# Patient Record
Sex: MALE | Race: WHITE | NOT HISPANIC OR LATINO | Employment: UNEMPLOYED | URBAN - METROPOLITAN AREA
[De-identification: names, ages, dates, MRNs, and addresses within clinical notes are randomized per-mention and may not be internally consistent; named-entity substitution may affect disease eponyms.]

---

## 2024-03-05 ENCOUNTER — HOSPITAL ENCOUNTER (OUTPATIENT)
Dept: RADIOLOGY | Facility: MEDICAL CENTER | Age: 49
End: 2024-03-05
Attending: INTERNAL MEDICINE
Payer: COMMERCIAL

## 2024-03-05 DIAGNOSIS — D47.3 ESSENTIAL THROMBOCYTHEMIA (HCC): ICD-10-CM

## 2024-03-05 DIAGNOSIS — D75.839 THROMBOCYTHEMIA: ICD-10-CM

## 2024-03-05 PROCEDURE — 76700 US EXAM ABDOM COMPLETE: CPT

## 2024-09-20 ENCOUNTER — HOSPITAL ENCOUNTER (OUTPATIENT)
Dept: RADIOLOGY | Facility: MEDICAL CENTER | Age: 49
End: 2024-09-20
Attending: STUDENT IN AN ORGANIZED HEALTH CARE EDUCATION/TRAINING PROGRAM
Payer: COMMERCIAL

## 2024-09-20 DIAGNOSIS — R91.8 LUNG FIELD ABNORMAL: ICD-10-CM

## 2024-09-20 LAB — GLUCOSE BLD-MCNC: 89 MG/DL (ref 65–99)

## 2024-09-20 PROCEDURE — A9552 F18 FDG: HCPCS

## 2024-10-07 ASSESSMENT — ENCOUNTER SYMPTOMS
CHEST TIGHTNESS: 0
RESPIRATORY SYMPTOMS COMMENTS: NO
HEMOPTYSIS: 0
WHEEZING: 0
DYSPNEA AT REST: 0
SHORTNESS OF BREATH: 0

## 2024-10-10 ENCOUNTER — HOSPITAL ENCOUNTER (OUTPATIENT)
Dept: RADIOLOGY | Facility: MEDICAL CENTER | Age: 49
End: 2024-10-10
Attending: NURSE PRACTITIONER

## 2024-10-10 ENCOUNTER — HOSPITAL ENCOUNTER (OUTPATIENT)
Dept: RADIOLOGY | Facility: MEDICAL CENTER | Age: 49
End: 2024-10-10

## 2024-10-10 ENCOUNTER — HOSPITAL ENCOUNTER (OUTPATIENT)
Dept: RADIOLOGY | Facility: MEDICAL CENTER | Age: 49
End: 2024-10-10
Attending: HOMEOPATH

## 2024-10-10 ENCOUNTER — OFFICE VISIT (OUTPATIENT)
Dept: SLEEP MEDICINE | Facility: MEDICAL CENTER | Age: 49
End: 2024-10-10
Attending: INTERNAL MEDICINE
Payer: COMMERCIAL

## 2024-10-10 ENCOUNTER — HOSPITAL ENCOUNTER (OUTPATIENT)
Dept: RADIOLOGY | Facility: MEDICAL CENTER | Age: 49
End: 2024-10-10
Attending: INTERNAL MEDICINE

## 2024-10-10 ENCOUNTER — HOSPITAL ENCOUNTER (OUTPATIENT)
Dept: RADIOLOGY | Facility: MEDICAL CENTER | Age: 49
End: 2024-10-10
Attending: EMERGENCY MEDICINE

## 2024-10-10 VITALS
DIASTOLIC BLOOD PRESSURE: 62 MMHG | OXYGEN SATURATION: 96 % | WEIGHT: 194 LBS | HEIGHT: 71 IN | SYSTOLIC BLOOD PRESSURE: 104 MMHG | HEART RATE: 86 BPM | BODY MASS INDEX: 27.16 KG/M2

## 2024-10-10 DIAGNOSIS — Z23 NEED FOR VACCINATION: ICD-10-CM

## 2024-10-10 DIAGNOSIS — K21.00 GASTROESOPHAGEAL REFLUX DISEASE WITH ESOPHAGITIS WITHOUT HEMORRHAGE: ICD-10-CM

## 2024-10-10 DIAGNOSIS — J69.0 ASPIRATION PNEUMONITIS (HCC): ICD-10-CM

## 2024-10-10 DIAGNOSIS — J43.2 CENTRILOBULAR EMPHYSEMA (HCC): ICD-10-CM

## 2024-10-10 DIAGNOSIS — I27.20 PULMONARY HYPERTENSION (HCC): ICD-10-CM

## 2024-10-10 PROCEDURE — 99212 OFFICE O/P EST SF 10 MIN: CPT | Performed by: INTERNAL MEDICINE

## 2024-10-10 PROCEDURE — 3074F SYST BP LT 130 MM HG: CPT | Performed by: INTERNAL MEDICINE

## 2024-10-10 PROCEDURE — 3078F DIAST BP <80 MM HG: CPT | Performed by: INTERNAL MEDICINE

## 2024-10-10 PROCEDURE — 99205 OFFICE O/P NEW HI 60 MIN: CPT | Performed by: INTERNAL MEDICINE

## 2024-10-10 PROCEDURE — 90471 IMMUNIZATION ADMIN: CPT

## 2024-10-10 RX ORDER — PANTOPRAZOLE SODIUM 40 MG/1
40 TABLET, DELAYED RELEASE ORAL DAILY
Qty: 90 TABLET | Refills: 3 | Status: SHIPPED | OUTPATIENT
Start: 2024-10-10

## 2024-10-10 ASSESSMENT — PATIENT HEALTH QUESTIONNAIRE - PHQ9: CLINICAL INTERPRETATION OF PHQ2 SCORE: 0

## 2024-10-23 ENCOUNTER — ANCILLARY PROCEDURE (OUTPATIENT)
Dept: CARDIOLOGY | Facility: MEDICAL CENTER | Age: 49
End: 2024-10-23
Attending: INTERNAL MEDICINE
Payer: COMMERCIAL

## 2024-10-23 DIAGNOSIS — I27.20 PULMONARY HYPERTENSION (HCC): ICD-10-CM

## 2024-10-23 LAB
LV EJECT FRACT MOD 2C 99903: 55.31
LV EJECT FRACT MOD 4C 99902: 57.63
LV EJECT FRACT MOD BP 99901: 56.43

## 2024-10-23 PROCEDURE — 93306 TTE W/DOPPLER COMPLETE: CPT

## 2024-10-23 PROCEDURE — 93306 TTE W/DOPPLER COMPLETE: CPT | Mod: 26 | Performed by: STUDENT IN AN ORGANIZED HEALTH CARE EDUCATION/TRAINING PROGRAM

## 2024-10-28 ENCOUNTER — APPOINTMENT (OUTPATIENT)
Dept: LAB | Facility: MEDICAL CENTER | Age: 49
End: 2024-10-28
Payer: COMMERCIAL

## 2024-11-06 ENCOUNTER — HOSPITAL ENCOUNTER (OUTPATIENT)
Dept: LAB | Facility: MEDICAL CENTER | Age: 49
End: 2024-11-06
Attending: INTERNAL MEDICINE
Payer: COMMERCIAL

## 2024-11-06 DIAGNOSIS — J43.2 CENTRILOBULAR EMPHYSEMA (HCC): ICD-10-CM

## 2024-11-06 PROCEDURE — 82103 ALPHA-1-ANTITRYPSIN TOTAL: CPT

## 2024-11-06 PROCEDURE — 36415 COLL VENOUS BLD VENIPUNCTURE: CPT

## 2024-11-07 LAB — A1AT SERPL-MCNC: 203 MG/DL (ref 90–200)

## 2024-11-12 ENCOUNTER — HOSPITAL ENCOUNTER (OUTPATIENT)
Dept: RADIOLOGY | Facility: MEDICAL CENTER | Age: 49
End: 2024-11-12
Attending: INTERNAL MEDICINE
Payer: COMMERCIAL

## 2024-11-12 DIAGNOSIS — J69.0 ASPIRATION PNEUMONITIS (HCC): ICD-10-CM

## 2024-11-12 PROCEDURE — 71250 CT THORAX DX C-: CPT

## 2024-11-30 DIAGNOSIS — J69.0 ASPIRATION PNEUMONITIS (HCC): ICD-10-CM

## 2025-03-31 ENCOUNTER — HOSPITAL ENCOUNTER (OUTPATIENT)
Dept: RADIOLOGY | Facility: MEDICAL CENTER | Age: 50
End: 2025-03-31
Attending: INTERNAL MEDICINE

## 2025-03-31 ENCOUNTER — OFFICE VISIT (OUTPATIENT)
Dept: SLEEP MEDICINE | Facility: MEDICAL CENTER | Age: 50
End: 2025-03-31
Payer: COMMERCIAL

## 2025-03-31 VITALS
OXYGEN SATURATION: 96 % | BODY MASS INDEX: 26.04 KG/M2 | HEIGHT: 71 IN | DIASTOLIC BLOOD PRESSURE: 58 MMHG | SYSTOLIC BLOOD PRESSURE: 104 MMHG | WEIGHT: 186 LBS | RESPIRATION RATE: 16 BRPM | HEART RATE: 67 BPM

## 2025-03-31 DIAGNOSIS — R93.89 ABNORMAL CT OF THE CHEST: ICD-10-CM

## 2025-03-31 DIAGNOSIS — J98.4 RESTRICTIVE LUNG DISEASE: ICD-10-CM

## 2025-03-31 DIAGNOSIS — J47.9 BRONCHIECTASIS WITHOUT COMPLICATION (HCC): Primary | ICD-10-CM

## 2025-03-31 PROCEDURE — 99212 OFFICE O/P EST SF 10 MIN: CPT

## 2025-03-31 RX ORDER — ALBUTEROL SULFATE 90 UG/1
2 INHALANT RESPIRATORY (INHALATION) EVERY 6 HOURS PRN
COMMUNITY

## 2025-03-31 ASSESSMENT — ENCOUNTER SYMPTOMS
SPUTUM PRODUCTION: 0
SHORTNESS OF BREATH: 0
FEVER: 0
COUGH: 0
WEIGHT LOSS: 0
STRIDOR: 0
WHEEZING: 0
PALPITATIONS: 0
DIZZINESS: 0
HEMOPTYSIS: 0
CHILLS: 0
DEPRESSION: 0
HEARTBURN: 0

## 2025-03-31 ASSESSMENT — FIBROSIS 4 INDEX: FIB4 SCORE: 0.66

## 2025-03-31 ASSESSMENT — PATIENT HEALTH QUESTIONNAIRE - PHQ9: CLINICAL INTERPRETATION OF PHQ2 SCORE: 0

## 2025-03-31 NOTE — PROGRESS NOTES
Pulmonary Clinic follow up    Date of Service: 3/31/2025    Reason for follow up:  Follow-Up ( Abnormal findings on Lung Field. Last seen 10/10/24) and Results ( CT-Chest 02/13/25)      History of Present Illness:     Jalen Avendano is a 49 y.o. male being evaluated in clinic today for f/u on abnormal imaging.  Patient was last seen by Dr. Pinto on 10/10/2024.  Patient was initially referred to oncology and Dr. Thompson for abnormal imaging of the chest, which at the time had concerns of a right lung mass.  The imaging was performed after patient had COVID in October 2023, with a long recovery, followed by influenza in December 2023 that turned into bronchitis.  Patient then had pneumonia in May 2024.  A PET/CT was performed on 9/20/2024, which showed diffuse increased uptake in the right lower lobe with an SUV max of 10.3, which was favored to be infection.  Patient had a biopsy of the right lower lobe tissue, which favored a benign fibrotic process.  Patient was referred to pulmonary, and an echocardiogram and alpha-1 antitrypsin level were ordered, to workup possible causes emphysema and enlarged pulmonary artery that was noted on CT scan.  Echocardiogram was stable, with no suspected elevation of RVSP that would suggest pulmonary hypertension.  Patient was started on a PPI due to esophageal changes on the CT, and the patient also had intermittent symptoms of acid reflux.  Patient has been consistent with lifestyle modifications and pantoprazole daily, with no recurrence of acid reflux symptoms.  Patient is also following with Dr. Anderson at [facility], where he has CT scans every 6 months and PFT that showed mild to moderate restriction.  He is pending a repeat CT in the next 4 to 5 months, along with a PFT.  Patient has no personal or family history of autoimmune disease or symptoms of autoimmune disease.  Patient worked at [employer] for 8 years and returns, and his hobbies include hiking and outdoor  activities, music concerts.  Patient has no notable occupational or environmental exposures that would increase his risk for interstitial lung disease.  Patient reports his shortness of breath is improved over the last year with increased exercise.  Patient has no sputum production, cough, fever, chills, or weight loss.  Patient also also pending evaluation from GI at the end of April, which she will likely have an endoscopy to evaluate for mechanical GERD.    Pulmonary Hx:   - former smoker of 8 pack years, quit in 2010    MMRC Grade:   0- Breathless only during strenuous exercise  1- Short of breath when hurrying or going up a small hill  2- Walks slower than friends due to breathlessness, has to stop at own pace  3- Stops to catch breath on level ground after 100m  4- Breathless with ambulating around house or ADLs     Review of Systems   Constitutional:  Negative for chills, fever and weight loss.   Respiratory:  Negative for cough, hemoptysis, sputum production, shortness of breath, wheezing and stridor.    Cardiovascular:  Negative for chest pain, palpitations and leg swelling.   Gastrointestinal:  Negative for heartburn.   Skin:  Negative for rash.   Neurological:  Negative for dizziness.   Endo/Heme/Allergies:  Negative for environmental allergies.   Psychiatric/Behavioral:  Negative for depression.        Current Outpatient Medications on File Prior to Visit   Medication Sig Dispense Refill    albuterol 108 (90 Base) MCG/ACT Aero Soln inhalation aerosol Inhale 2 Puffs every 6 hours as needed for Shortness of Breath.      pantoprazole (PROTONIX) 40 MG Tablet Delayed Response Take 1 Tablet by mouth every day. 90 Tablet 3    losartan (COZAAR) 25 MG Tab Take 50 mg by mouth every day.      aspirin (ASA) 81 MG CHEW Chew 81 mg every day.      Loratadine (CLARITIN PO) Take  by mouth.       No current facility-administered medications on file prior to visit.     Social History     Tobacco Use    Smoking status:  "Former     Current packs/day: 1.00     Average packs/day: 1 pack/day for 8.0 years (8.0 ttl pk-yrs)     Types: Cigarettes     Passive exposure: Never    Smokeless tobacco: Never   Vaping Use    Vaping status: Never Used   Substance Use Topics    Alcohol use: Yes     Alcohol/week: 1.8 oz     Types: 1 Glasses of wine, 2 Cans of beer per week    Drug use: Not Currently     Types: Marijuana      Past Medical History:   Diagnosis Date    Bronchitis     Centrilobular emphysema (HCC) 10/10/2024    Chickenpox     Gastroesophageal reflux disease with esophagitis without hemorrhage 10/10/2024    GERD (gastroesophageal reflux disease)     Hypertension     Influenza     Pneumonia     Wears glasses     Weight loss      Past Surgical History:   Procedure Laterality Date    LUNG BIOPSY OPEN       Pcn [penicillins]  Family History   Problem Relation Age of Onset    Cancer Mother         Tongue Cancer    Alzheimer's Disease Father         He had [place] for around 15 years    Sleep Apnea Father     Cancer Paternal Grandfather     Colon Cancer Paternal Grandfather      Ambulatory Vitals  Encounter Vitals  Blood Pressure: 104/58  Pulse: 67  Respiration: 16  Pulse Oximetry: 96 %  Weight: 84.4 kg (186 lb)  Height: 180.3 cm (5' 11\")  BMI (Calculated): 25.94   Physical Exam  Constitutional:       General: He is not in acute distress.  HENT:      Head: Normocephalic.      Nose: Nose normal.      Mouth/Throat:      Mouth: Mucous membranes are moist.   Eyes:      Conjunctiva/sclera: Conjunctivae normal.   Cardiovascular:      Rate and Rhythm: Normal rate and regular rhythm.      Heart sounds: No murmur heard.  Pulmonary:      Effort: Pulmonary effort is normal. No respiratory distress.      Comments: (+) mild rales to RLL  Abdominal:      General: There is no distension.   Musculoskeletal:      Right lower leg: No edema.      Left lower leg: No edema.   Skin:     General: Skin is warm and dry.      Capillary Refill: Capillary refill takes " less than 2 seconds.      Nails: There is no clubbing.   Neurological:      General: No focal deficit present.      Mental Status: He is alert and oriented to person, place, and time.   Psychiatric:         Mood and Affect: Mood normal.       Results:    CT chest 2/12/2025:          FINDINGS:     LUNGS:   Stable biapical pleural scarring.   Stable bands of a subpleural reticular density consistent with fibrosis and interstitial lung disease throughout both lungs most pronounced in the right lung.   Stable right lower lobe and of increased density with dilated air-containing bronchi and multiple small air-containing spaces.  This finding is most consistent with chronic consolidation, bronchiectasis and atelectasis.   Stable linear band of density in the right lower lobe anterobasal aspect confluent with the medial pleura consistent with fibrosis.   Stable elevation of the right hemidiaphragm.   Mild lung hyperinflation.   No pleural effusion.   Trachea and Central bronchi are unremarkable.     Mediastinum and luisa:   Stable borderline enlarged precarinal/peribronchial and tracheal lymph nodes the largest measuring 10 mm short axis.   Coronary arteries are not calcified.   Thoracic aorta is normal in caliber.   Heart is normal in size.     Echocardiogram 1023/2024:    CONCLUSIONS  Normal left ventricular systolic function. The ejection fraction is   measured to be 58 % by Sullivan's biplane.   Normal right ventricular size and systolic function.  Aortic sclerosis without significant stenosis.  Unable to estimate pulmonary artery pressure due to an inadequate   tricuspid regurgitant jet.  No prior study is available for comparison.     Latest Reference Range & Units 11/06/24 07:44   Alpha-1-Antitrypsin 90 - 200 mg/dL 203 (H)      Latest Reference Range & Units 02/09/25 10:01   Eos (Absolute) 0.0 - 0.5 10*3/uL 0.4 (E)   (E): External lab result        Vaccinations:    Covid: current  Flu: current     Assessment &  Plan  Bronchiectasis without complication (HCC)  Likely secondary to chronic aspiration.  Patient is taking pantoprazole daily, denies any symptoms of GERD.  No cough or congestion. Will evaluate immunoglobulins, was patient is prone to many infections in 2023/2024. Pending endoscopy with GI, has an appointment April 2025.    Orders:    IMMUNOGLOBULINS A/E/G/M, SERUM    Abnormal CT of the chest  Impression      STABLE RIGHT LOWER LOBE MODERATE-SIZED AREA OF CONSOLIDATION, BRONCHIECTASIS AND VOLUME LOSS CONSISTENT WITH CHRONIC PNEUMONIA, ATELECTASIS AND BRONCHIECTASIS.    STABLE RIGHT LOWER LOBE MEDIAL LINEAR SCAR.    MODERATE BILATERAL INTERSTITIAL LUNG DISEASE.    STABLE BORDERLINE ENLARGED CENTRAL MEDIASTINAL LYMPH NODES.    Plan:   Continue CT surveillance with [facility] every 6 months       Restrictive lung disease  PFTs every 6 months with [facility].  No risk factors for ILD.  Patient could benefit from bronchoscopy if endoscopy does not result any suggested GERD.  If any progression of PFTs or imaging, patient would benefit from evaluation in ILD clinic.  Patient is requesting to continue with [facility] for now.           Return in about 6 months (around 9/30/2025), or if symptoms worsen or fail to improve, for f/u on GI appt.     This note was generated using voice recognition software which has a chance of producing errors of grammar and possibly content.  I have made every reasonable attempt to find and correct any obvious errors, but it should be expected that some may not be found prior to finalization of this note.    Time spent in record review prior to patient arrival, reviewing results, and in face-to-face encounter totaled 42 min, excluding any procedures if performed.    Duncan HAMILTON  [facility]